# Patient Record
Sex: FEMALE | Race: WHITE | NOT HISPANIC OR LATINO
[De-identification: names, ages, dates, MRNs, and addresses within clinical notes are randomized per-mention and may not be internally consistent; named-entity substitution may affect disease eponyms.]

---

## 2020-11-05 ENCOUNTER — RESULT REVIEW (OUTPATIENT)
Age: 35
End: 2020-11-05

## 2021-06-10 ENCOUNTER — RESULT REVIEW (OUTPATIENT)
Age: 36
End: 2021-06-10

## 2022-05-13 PROBLEM — Z00.00 ENCOUNTER FOR PREVENTIVE HEALTH EXAMINATION: Status: ACTIVE | Noted: 2022-05-13

## 2022-05-17 ENCOUNTER — RESULT REVIEW (OUTPATIENT)
Age: 37
End: 2022-05-17

## 2022-05-22 ENCOUNTER — NON-APPOINTMENT (OUTPATIENT)
Age: 37
End: 2022-05-22

## 2022-05-27 ENCOUNTER — APPOINTMENT (OUTPATIENT)
Dept: BREAST CENTER | Facility: CLINIC | Age: 37
End: 2022-05-27
Payer: COMMERCIAL

## 2022-05-27 VITALS
HEART RATE: 61 BPM | HEIGHT: 63.5 IN | BODY MASS INDEX: 18.38 KG/M2 | SYSTOLIC BLOOD PRESSURE: 119 MMHG | DIASTOLIC BLOOD PRESSURE: 67 MMHG | WEIGHT: 105 LBS

## 2022-05-27 DIAGNOSIS — Z13.79 ENCOUNTER FOR OTHER SCREENING FOR GENETIC AND CHROMOSOMAL ANOMALIES: ICD-10-CM

## 2022-05-27 DIAGNOSIS — Z78.9 OTHER SPECIFIED HEALTH STATUS: ICD-10-CM

## 2022-05-27 DIAGNOSIS — Z80.0 FAMILY HISTORY OF MALIGNANT NEOPLASM OF DIGESTIVE ORGANS: ICD-10-CM

## 2022-05-27 DIAGNOSIS — C50.511 MALIGNANT NEOPLASM OF LOWER-OUTER QUADRANT OF RIGHT FEMALE BREAST: ICD-10-CM

## 2022-05-27 DIAGNOSIS — Z17.0 MALIGNANT NEOPLASM OF LOWER-OUTER QUADRANT OF RIGHT FEMALE BREAST: ICD-10-CM

## 2022-05-27 DIAGNOSIS — Z80.52 FAMILY HISTORY OF MALIGNANT NEOPLASM OF BLADDER: ICD-10-CM

## 2022-05-27 DIAGNOSIS — Z87.891 PERSONAL HISTORY OF NICOTINE DEPENDENCE: ICD-10-CM

## 2022-05-27 DIAGNOSIS — Z72.3 LACK OF PHYSICAL EXERCISE: ICD-10-CM

## 2022-05-27 DIAGNOSIS — R92.8 OTHER ABNORMAL AND INCONCLUSIVE FINDINGS ON DIAGNOSTIC IMAGING OF BREAST: ICD-10-CM

## 2022-05-27 PROCEDURE — 99205 OFFICE O/P NEW HI 60 MIN: CPT

## 2022-05-27 RX ORDER — ERGOCALCIFEROL 1.25 MG/1
1.25 MG CAPSULE, LIQUID FILLED ORAL
Refills: 0 | Status: ACTIVE | COMMUNITY

## 2022-05-27 NOTE — HISTORY OF PRESENT ILLNESS
[FreeTextEntry1] : This is a 37 year old  female whose  felt a lump in her left breast about 8 months ago.  She didn’t think much of it until she felt another harder lump a few months ago.  She was going through IUI with Dr. Pablo and asked him about it.  She was sent for imaging and biopsies were done of 2 areas in her right breast.  Both showed invasive cancer.  She met with Dr. Hutchinson and Dr. [facility]. [place] with [place]. Corado [place] [facility] [place]  She met with Dr. Kareem Orosco to discuss reconstructive surgery.\par \par She is currently on medication for an upcoming egg retrieval with Dr. Pablo.

## 2022-05-27 NOTE — CONSULT LETTER
[Dear  ___] : Dear  [unfilled], [Consult Letter:] : I had the pleasure of evaluating your patient, [unfilled]. [Sincerely,] : Sincerely, [DrJorge  ___] : Dr. WALL

## 2022-05-27 NOTE — PHYSICAL EXAM
[EOMI] : extra ocular movement intact [Sclera nonicteric] : sclera nonicteric [Supple] : supple [No Supraclavicular Adenopathy] : no supraclavicular adenopathy [No Cervical Adenopathy] : no cervical adenopathy [No Thyromegaly] : no thyromegaly [Clear to Auscultation Bilat] : clear to auscultation bilaterally [Normal Sinus Rhythm] : normal sinus rhythm [Normal S1, S2] : normal S1 and S2 [Examined in the supine and seated position] : examined in the supine and seated position [Bra Size: ___] : Bra Size: [unfilled] [No Nipple Retraction] : no left nipple retraction [No Nipple Discharge] : no left nipple discharge [No Axillary Lymphadenopathy] : no left axillary lymphadenopathy [Soft] : abdomen soft [Not Tender] : non-tender [No Palpable Masses] : no abdominal mass palpated [de-identified] : Firm irregular area UOQ  2-3 cm with mild postbiopsy changes. [de-identified] : Well defined 1.5 cm oval mobile rubbery mass 4:00 N4. [de-identified] : Tattoo right wrist

## 2022-05-27 NOTE — DATA REVIEWED
[FreeTextEntry1] : Bilateral mammogram  4/27/2022 ([facility]):   Right Palpable finding correlates to a spiculated mass in the UOQ. Segmental calcifications anterior and posterior extending 5.7-6 cm.  Nodular asymmetry may represent cysts.\par \par Bilateral breast ultrasound 4/28/2022:  Right 10 N6 irregular hypoechoic mass 15x57z50 mm, 10N8 7x7x6 mm irregular hypoechoi cmass, 5N5 circumscribed hypoechoic mass 04g5s35 mm.  Benign appearing lymph nodes.  Left 4 N3 62i3q31 mm oval hypoechoic mass.\par \par Bilateral breast MRI 5/20/2022:  Right- posterior lateral conglomeration of masses, two of which were biopsied with malignant pathology.  Overall area of enhancement 04o40x55 mm.  Left negative.\par \par Pathology 5/4/2022:  Right 10 N6= Invasive ductal carcinoma and DCIS 6 mm (Infinity clip)\par                                   Right 10N8= mod diff invasive ductal carcinoma SBR 6/9, 8 mm with DCIS (T clip)\par                                   ER 83% MD 26% Her 2 2+, FISH nonamplified, Ki 67 20%\par \par \par CT chest 5/25/2022:  No evidence of metastatic disease.\par \par CT abdomen and pelvis 5/25/2022:  No evidence of metastatic disease\par \par \par (Images were partly loaded onto PACS.  Missing images were reviewed on the [facility] portal.  The ultrasound was not complete on the portal either.)

## 2022-05-27 NOTE — PROCEDURE
[FreeTextEntry1] : Left breast ultrasound [FreeTextEntry2] : Assess lesion  [FreeTextEntry3] : The left 4:00 N4 breast shows a well defined hypoechoic mass 88a18q7 mm with slight posterior enhancement.

## 2022-06-20 ENCOUNTER — NON-APPOINTMENT (OUTPATIENT)
Age: 37
End: 2022-06-20